# Patient Record
Sex: FEMALE | Race: WHITE | NOT HISPANIC OR LATINO | Employment: OTHER | URBAN - METROPOLITAN AREA
[De-identification: names, ages, dates, MRNs, and addresses within clinical notes are randomized per-mention and may not be internally consistent; named-entity substitution may affect disease eponyms.]

---

## 2023-02-08 ENCOUNTER — LAB REQUISITION (OUTPATIENT)
Dept: LAB | Facility: CLINIC | Age: 63
End: 2023-02-08
Payer: COMMERCIAL

## 2023-02-08 DIAGNOSIS — L90.0 LICHEN SCLEROSUS ET ATROPHICUS: ICD-10-CM

## 2023-02-08 PROCEDURE — 88305 TISSUE EXAM BY PATHOLOGIST: CPT | Mod: TC,ORL | Performed by: OBSTETRICS & GYNECOLOGY

## 2023-02-08 PROCEDURE — 88305 TISSUE EXAM BY PATHOLOGIST: CPT | Mod: 26 | Performed by: PATHOLOGY

## 2023-02-10 LAB
PATH REPORT.COMMENTS IMP SPEC: NORMAL
PATH REPORT.COMMENTS IMP SPEC: NORMAL
PATH REPORT.FINAL DX SPEC: NORMAL
PATH REPORT.GROSS SPEC: NORMAL
PATH REPORT.MICROSCOPIC SPEC OTHER STN: NORMAL
PATH REPORT.RELEVANT HX SPEC: NORMAL
PHOTO IMAGE: NORMAL

## 2024-04-10 ENCOUNTER — HOSPITAL ENCOUNTER (EMERGENCY)
Facility: CLINIC | Age: 64
Discharge: HOME | End: 2024-04-10
Attending: SOCIAL WORKER | Admitting: SOCIAL WORKER
Payer: COMMERCIAL

## 2024-04-10 ENCOUNTER — APPOINTMENT (OUTPATIENT)
Dept: GENERAL RADIOLOGY | Facility: CLINIC | Age: 64
End: 2024-04-10
Attending: EMERGENCY MEDICINE
Payer: COMMERCIAL

## 2024-04-10 VITALS
HEIGHT: 64 IN | HEART RATE: 78 BPM | TEMPERATURE: 98.7 F | OXYGEN SATURATION: 100 % | WEIGHT: 197.09 LBS | SYSTOLIC BLOOD PRESSURE: 162 MMHG | RESPIRATION RATE: 16 BRPM | DIASTOLIC BLOOD PRESSURE: 82 MMHG | BODY MASS INDEX: 33.65 KG/M2

## 2024-04-10 DIAGNOSIS — R01.1 HEART MURMUR: ICD-10-CM

## 2024-04-10 DIAGNOSIS — I10 HYPERTENSION, UNSPECIFIED TYPE: ICD-10-CM

## 2024-04-10 DIAGNOSIS — G47.09 OTHER INSOMNIA: ICD-10-CM

## 2024-04-10 LAB
ANION GAP SERPL CALCULATED.3IONS-SCNC: 14 MMOL/L (ref 7–15)
ATRIAL RATE - MUSE: 76 BPM
BASOPHILS # BLD AUTO: 0.1 10E3/UL (ref 0–0.2)
BASOPHILS NFR BLD AUTO: 1 %
BUN SERPL-MCNC: 10.2 MG/DL (ref 8–23)
CALCIUM SERPL-MCNC: 9.2 MG/DL (ref 8.8–10.2)
CHLORIDE SERPL-SCNC: 105 MMOL/L (ref 98–107)
CREAT SERPL-MCNC: 0.71 MG/DL (ref 0.51–0.95)
DEPRECATED HCO3 PLAS-SCNC: 23 MMOL/L (ref 22–29)
DIASTOLIC BLOOD PRESSURE - MUSE: NORMAL MMHG
EGFRCR SERPLBLD CKD-EPI 2021: >90 ML/MIN/1.73M2
EOSINOPHIL # BLD AUTO: 0.2 10E3/UL (ref 0–0.7)
EOSINOPHIL NFR BLD AUTO: 3 %
ERYTHROCYTE [DISTWIDTH] IN BLOOD BY AUTOMATED COUNT: 12.7 % (ref 10–15)
FLUAV RNA SPEC QL NAA+PROBE: NEGATIVE
FLUBV RNA RESP QL NAA+PROBE: NEGATIVE
GLUCOSE SERPL-MCNC: 92 MG/DL (ref 70–99)
HCT VFR BLD AUTO: 42.4 % (ref 35–47)
HGB BLD-MCNC: 14 G/DL (ref 11.7–15.7)
HOLD SPECIMEN: NORMAL
HOLD SPECIMEN: NORMAL
IMM GRANULOCYTES # BLD: 0 10E3/UL
IMM GRANULOCYTES NFR BLD: 0 %
INTERPRETATION ECG - MUSE: NORMAL
LYMPHOCYTES # BLD AUTO: 2.1 10E3/UL (ref 0.8–5.3)
LYMPHOCYTES NFR BLD AUTO: 35 %
MCH RBC QN AUTO: 29.7 PG (ref 26.5–33)
MCHC RBC AUTO-ENTMCNC: 33 G/DL (ref 31.5–36.5)
MCV RBC AUTO: 90 FL (ref 78–100)
MONOCYTES # BLD AUTO: 0.5 10E3/UL (ref 0–1.3)
MONOCYTES NFR BLD AUTO: 9 %
NEUTROPHILS # BLD AUTO: 3.1 10E3/UL (ref 1.6–8.3)
NEUTROPHILS NFR BLD AUTO: 52 %
NRBC # BLD AUTO: 0 10E3/UL
NRBC BLD AUTO-RTO: 0 /100
NT-PROBNP SERPL-MCNC: <36 PG/ML (ref 0–900)
P AXIS - MUSE: 52 DEGREES
PLATELET # BLD AUTO: 319 10E3/UL (ref 150–450)
POTASSIUM SERPL-SCNC: 4.2 MMOL/L (ref 3.4–5.3)
PR INTERVAL - MUSE: 150 MS
QRS DURATION - MUSE: 78 MS
QT - MUSE: 392 MS
QTC - MUSE: 441 MS
R AXIS - MUSE: 19 DEGREES
RBC # BLD AUTO: 4.71 10E6/UL (ref 3.8–5.2)
RSV RNA SPEC NAA+PROBE: NEGATIVE
SARS-COV-2 RNA RESP QL NAA+PROBE: NEGATIVE
SODIUM SERPL-SCNC: 142 MMOL/L (ref 135–145)
SYSTOLIC BLOOD PRESSURE - MUSE: NORMAL MMHG
T AXIS - MUSE: 31 DEGREES
TROPONIN T SERPL HS-MCNC: <6 NG/L
VENTRICULAR RATE- MUSE: 76 BPM
WBC # BLD AUTO: 5.9 10E3/UL (ref 4–11)

## 2024-04-10 PROCEDURE — 85025 COMPLETE CBC W/AUTO DIFF WBC: CPT | Performed by: EMERGENCY MEDICINE

## 2024-04-10 PROCEDURE — 85025 COMPLETE CBC W/AUTO DIFF WBC: CPT | Performed by: SOCIAL WORKER

## 2024-04-10 PROCEDURE — 71046 X-RAY EXAM CHEST 2 VIEWS: CPT

## 2024-04-10 PROCEDURE — 80048 BASIC METABOLIC PNL TOTAL CA: CPT | Performed by: EMERGENCY MEDICINE

## 2024-04-10 PROCEDURE — 84484 ASSAY OF TROPONIN QUANT: CPT | Performed by: SOCIAL WORKER

## 2024-04-10 PROCEDURE — 99285 EMERGENCY DEPT VISIT HI MDM: CPT | Mod: 25

## 2024-04-10 PROCEDURE — 84484 ASSAY OF TROPONIN QUANT: CPT | Performed by: EMERGENCY MEDICINE

## 2024-04-10 PROCEDURE — 80048 BASIC METABOLIC PNL TOTAL CA: CPT | Performed by: SOCIAL WORKER

## 2024-04-10 PROCEDURE — 83880 ASSAY OF NATRIURETIC PEPTIDE: CPT | Performed by: EMERGENCY MEDICINE

## 2024-04-10 PROCEDURE — 93005 ELECTROCARDIOGRAM TRACING: CPT

## 2024-04-10 PROCEDURE — 87637 SARSCOV2&INF A&B&RSV AMP PRB: CPT | Performed by: EMERGENCY MEDICINE

## 2024-04-10 PROCEDURE — 36415 COLL VENOUS BLD VENIPUNCTURE: CPT | Performed by: EMERGENCY MEDICINE

## 2024-04-10 RX ORDER — AMLODIPINE BESYLATE 5 MG/1
5 TABLET ORAL DAILY
Qty: 14 TABLET | Refills: 0 | Status: SHIPPED | OUTPATIENT
Start: 2024-04-10 | End: 2024-05-15

## 2024-04-10 ASSESSMENT — COLUMBIA-SUICIDE SEVERITY RATING SCALE - C-SSRS
6. HAVE YOU EVER DONE ANYTHING, STARTED TO DO ANYTHING, OR PREPARED TO DO ANYTHING TO END YOUR LIFE?: NO
2. HAVE YOU ACTUALLY HAD ANY THOUGHTS OF KILLING YOURSELF IN THE PAST MONTH?: NO
1. IN THE PAST MONTH, HAVE YOU WISHED YOU WERE DEAD OR WISHED YOU COULD GO TO SLEEP AND NOT WAKE UP?: NO

## 2024-04-10 ASSESSMENT — ACTIVITIES OF DAILY LIVING (ADL): ADLS_ACUITY_SCORE: 33

## 2024-04-10 NOTE — DISCHARGE INSTRUCTIONS
You were seen in the emergency department for elevated blood pressure, sensation of having to take a deep breath to fill your lungs, and insomnia.  Your exam was overall reassuring, we not see signs of an acute emergency at this time.  I am starting you on a blood pressure medication.  Please call your primary care clinic to schedule a follow-up appointment, they may want to change his blood pressure medication. You can try taking melatonin at home for your sleep, take this about 1 hour before going to bed.    Sent a referral to the cardiology team to follow-up with you and establish care given your known heart murmur.    If you start to have shortness of breath, chest pain when you are walking around especially if it is believed by sweating or nausea or vomiting, if you cough up blood, if you have sudden severe headache, weakness in one-sided body please come back to the emergency department.

## 2024-04-10 NOTE — ED TRIAGE NOTES
Pt here for high blood pressure readings that began after a walk today. Also mentions feeling more SOB, fatigued, and has some L arm tingling. No hx of hypertension. Breathing easy and unlabored in triage.

## 2024-04-10 NOTE — ED PROVIDER NOTES
"  History     Chief Complaint:  Hypertension    HPI   Isabela Mosqueda is a 64 year old female who presents with shortness of breath and concern for elevated blood pressure. Patient states that for the past few days she has been feeling like she has to yawn to get a full breath in. Also notes difficulty sleeping, increased fatigue, and some tingling in the arms. Reports that today she was on a walk with her , and when they arrived at home they both took her blood pressure; her  states that hers was 153/113. She had a recent OB/gyn visit with a blood pressure in the systolic 150's as well. She is not taking any hypertensive medications at this time. Denies any hemoptysis, leg swelling, or fevers. She has a doctor that she visits occasionally. Has a known heart murmur but has not seen cardiology for this. She has not tried melatonin to sleep. Otherwise denies any known health problems. Reports increased stress.     Independent Historian:    Patient and  report history as above.    Review of External Notes:  I reviewed the office visit from 1/31/2023, blood pressure at that time was noted to be in the systolics 150    Allergies:  Misc. Sulfonamide Containing Compounds  Sulfa Antibiotics     Relevant Medical History:  The patient denies any prior medical history.    Physical Exam   Patient Vitals for the past 24 hrs:   BP Temp Temp src Pulse Resp SpO2 Height Weight   04/10/24 1610 (!) 162/82 -- -- 78 16 100 % -- --   04/10/24 1230 (!) 160/92 98.7  F (37.1  C) Oral 80 20 97 % 1.626 m (5' 4\") 89.4 kg (197 lb 1.5 oz)        Physical Exam  General: Overall stable and nontoxic appearing  HEENT: Conjunctivae clear, no scleral icterus, mucous membranes moist  Neuro: Alert, moving all extremities equally with intention  CV: Systolic murmur. Regular rate and rhythm, radial and DP pulses equal  Respiratory: No signs of respiratory distress, lungs clear to auscultation bilaterally   Abdomen: Soft, without " rigidity or rebound throughout  MSK: No lower extremity swelling or tenderness     Emergency Department Course   ECG  Electrocardiogram  ECG results from 04/10/24   EKG 12-lead, tracing only     Value    Systolic Blood Pressure     Diastolic Blood Pressure     Ventricular Rate 76    Atrial Rate 76    VA Interval 150    QRS Duration 78        QTc 441    P Axis 52    R AXIS 19    T Axis 31    Interpretation ECG      Sinus rhythm  Normal ECG  No previous ECGs available  Unconfirmed report - interpretation of this ECG is computer generated - see medical record for final interpretation  Confirmed by - EMERGENCY ROOM, PHYSICIAN ([id number]),  JACOB LOPEZ ([id number]) on 4/10/2024 3:43:09 PM       Laboratory: Imaging:   Labs Ordered and Resulted from Time of ED Arrival to Time of ED Departure   BASIC METABOLIC PANEL - Normal       Result Value    Sodium 142      Potassium 4.2      Chloride 105      Carbon Dioxide (CO2) 23      Anion Gap 14      Urea Nitrogen 10.2      Creatinine 0.71      GFR Estimate >90      Calcium 9.2      Glucose 92     TROPONIN T, HIGH SENSITIVITY - Normal    Troponin T, High Sensitivity <6     NT PROBNP INPATIENT - Normal    N terminal Pro BNP Inpatient <36     INFLUENZA A/B, RSV, & SARS-COV2 PCR - Normal    Influenza A PCR Negative      Influenza B PCR Negative      RSV PCR Negative      SARS CoV2 PCR Negative     CBC WITH PLATELETS AND DIFFERENTIAL    WBC Count 5.9      RBC Count 4.71      Hemoglobin 14.0      Hematocrit 42.4      MCV 90      MCH 29.7      MCHC 33.0      RDW 12.7      Platelet Count 319      % Neutrophils 52      % Lymphocytes 35      % Monocytes 9      % Eosinophils 3      % Basophils 1      % Immature Granulocytes 0      NRBCs per 100 WBC 0      Absolute Neutrophils 3.1      Absolute Lymphocytes 2.1      Absolute Monocytes 0.5      Absolute Eosinophils 0.2      Absolute Basophils 0.1      Absolute Immature Granulocytes 0.0      Absolute NRBCs 0.0       XR Chest 2 Views    Final Result   IMPRESSION: No acute cardiopulmonary process.      EBER QIU MD            SYSTEM ID:  [id number]              Procedures       Emergency Department Course & Assessments:       Interventions:  Medications - No data to display     Assessments, Independent Interpretation, Consult/Discussion of ManagementTests:  ED Course as of 04/10/24 1713   Wed Apr 10, 2024   154 I entered the patient's room and obtained history. I believe she is safe for discharge at this time.        Social Determinants of Health affecting care:  None    Disposition:  The patient was discharged to home.     Impression & Plan    CMS Diagnoses: None    Code Status: No Order    Medical Decision Makin-year-old female with a known heart murmur since childhood, no other medical problems and not any medication who presented to the emergency department with a chief complaint of elevated blood pressure [place]  Also reported that she felt a sensation that she needed to yawn to take a deep breath in over the past five days.  She denied any overt shortness of breath [place].  Troponin entirely negative, ruled out for MI by [facility] ACS pathway.  Lungs clear to auscultation, chest x-ray without any consolidations.  No fever desist pneumonia.  Murmur present however no signs of decompensation secondary to this. BNP negative, no clinical signs of heart failure.  EKG nonischemic.  I doubt pulmonary embolism, not tachypneic for me, no hypoxia and ambulated without feeling short of breath. Description of her needing to take a deep breath does not sound like breathlessness.  Also doubt aortic dissection given overall well appearance, she has been documented to have hypertension to 150s up to 1 year ago.  Prescribed amlodipine for her, sent a referral for cardiology for follow-up for this known murmur she has never had cardiology follow-up for this.   Encouraged to follow-up with her primary care provider.  She will return to the emergency department for shortness of breath, chest pain, syncope, fever.    Diagnosis:    ICD-10-CM    1. Hypertension, unspecified type  I10       2. Other insomnia  G47.09            Discharge Medications:  New Prescriptions    AMLODIPINE (NORVASC) 5 MG TABLET    Take 1 tablet (5 mg) by mouth daily for 14 days          4/10/2024   Karen Rojas MD    IGianluca Hired, am serving as a scribe on 4/10/2024 to document services personally performed by Karen Rojas MD based on my observations and the provider's statements to me.        Karen Rojas MD  04/11/24 1133

## 2024-04-16 ENCOUNTER — OFFICE VISIT (OUTPATIENT)
Dept: CARDIOLOGY | Facility: CLINIC | Age: 64
End: 2024-04-16
Attending: SOCIAL WORKER
Payer: COMMERCIAL

## 2024-04-16 ENCOUNTER — TELEPHONE (OUTPATIENT)
Dept: CARDIOLOGY | Facility: CLINIC | Age: 64
End: 2024-04-16

## 2024-04-16 VITALS
WEIGHT: 197.7 LBS | DIASTOLIC BLOOD PRESSURE: 82 MMHG | HEIGHT: 64 IN | BODY MASS INDEX: 33.75 KG/M2 | HEART RATE: 80 BPM | SYSTOLIC BLOOD PRESSURE: 162 MMHG

## 2024-04-16 DIAGNOSIS — E78.2 MIXED HYPERLIPIDEMIA: ICD-10-CM

## 2024-04-16 DIAGNOSIS — Z82.49 FAMILY HISTORY OF ISCHEMIC HEART DISEASE: ICD-10-CM

## 2024-04-16 DIAGNOSIS — E66.09 CLASS 1 OBESITY DUE TO EXCESS CALORIES WITHOUT SERIOUS COMORBIDITY WITH BODY MASS INDEX (BMI) OF 33.0 TO 33.9 IN ADULT: ICD-10-CM

## 2024-04-16 DIAGNOSIS — R73.01 IMPAIRED FASTING GLUCOSE: ICD-10-CM

## 2024-04-16 DIAGNOSIS — E66.811 CLASS 1 OBESITY DUE TO EXCESS CALORIES WITHOUT SERIOUS COMORBIDITY WITH BODY MASS INDEX (BMI) OF 33.0 TO 33.9 IN ADULT: ICD-10-CM

## 2024-04-16 DIAGNOSIS — E78.2 MIXED HYPERLIPIDEMIA: Primary | ICD-10-CM

## 2024-04-16 DIAGNOSIS — R01.1 HEART MURMUR: ICD-10-CM

## 2024-04-16 DIAGNOSIS — I10 BENIGN ESSENTIAL HYPERTENSION: Primary | ICD-10-CM

## 2024-04-16 PROBLEM — E78.00 HYPERCHOLESTEROLEMIA: Status: ACTIVE | Noted: 2024-04-16

## 2024-04-16 PROCEDURE — 99204 OFFICE O/P NEW MOD 45 MIN: CPT | Performed by: INTERNAL MEDICINE

## 2024-04-16 RX ORDER — ROSUVASTATIN CALCIUM 10 MG/1
10 TABLET, COATED ORAL DAILY
Qty: 90 TABLET | Refills: 3 | Status: SHIPPED | OUTPATIENT
Start: 2024-04-16

## 2024-04-16 RX ORDER — FLUTICASONE PROPIONATE 50 MCG
2 SPRAY, SUSPENSION (ML) NASAL DAILY PRN
COMMUNITY

## 2024-04-16 RX ORDER — CLOBETASOL PROPIONATE 0.5 MG/G
CREAM TOPICAL
COMMUNITY

## 2024-04-16 RX ORDER — ESTRADIOL 0.1 MG/G
CREAM VAGINAL
COMMUNITY

## 2024-04-16 RX ORDER — COLLAGENASE CLOSTRIDIUM HIST.
POWDER (EA) MISCELLANEOUS DAILY
COMMUNITY
End: 2024-04-16

## 2024-04-16 NOTE — LETTER
4/16/2024    DANIEL Nixon CNP [place] [id number] [facility] [id number]    RE: Isabela A Jaylin       Dear Colleague,     I had the pleasure of seeing Isabela Mosqueda in the [facility].  HPI and Plan:   Isabela is a very nice 64-year-old referred for evaluation of heart murmur, hypertension and shortness of breath.      Review of the chart shows that she has impaired fasting glucose with a hemoglobin A1c of 5.9.  Mixed hyperlipidemia with total cholesterol 271 HDL 48  and triglycerides of 235.  She is a lifelong non-smoker.  Does not drink alcohol.  She is obese with a body mass index of 33.9    Family history is significant for mother having valve surgery and coronary bypass grafting in her late 60s living to 83 years of age.  Father has no cardiac history.  She states she has had a heart murmur her whole life.  Review of [facility] records shows an echocardiogram from 2004 describing a normal heart with normal valves.    She went to the ER last week because she had severe hypertension and some shortness of breath.  She states she has been under a great deal of stress.  Her  had his first stroke back in September about 2 weeks before their daughter's wedding.  He had a significant fall 2 weeks after the wedding.  And unfortunately in December had another cerebrovascular accident and is now retired which is also causing her stress.    Prior to her daughter's wedding she was working out with a  and was in great shape and had no symptoms.  Unfortunately she has not exercised since that time.  She has gained 10 pounds of weight.  She still walks about a mile and a half with her  every day without any limitations or problems.  She states she was only short of breath the day she went into the ER.  She has no orthopnea or PND.  She has no chest arm neck jaw or shoulder discomfort.  No peripheral edema.  No lightheadedness dizziness syncope or near  syncope.    Workup in the ER showed normal troponins and a normal N-terminal proBNP.  EKG demonstrates normal sinus rhythm and a normal EKG.    Her primary care provider has started her on amlodipine 5.  She thinks they may have already started a statin as well.  I do not see this in her chart.    Assessment and plan.  Isabela has a impressive murmur possibly aortic stenosis.  Will start out with a stress echocardiogram.  This will allow us to see if there is any structural abnormalities and also to see what her rhythm and blood pressure does with exercise.  It will also give us insight as to whether she has any significant underlying coronary disease.    Obviously if there is significant valvular pathology or abnormal stress test we will proceed as appropriate.  If on the other hand she does not appear to have ischemia or significant valve problems I will set up a calcium score.    I think her symptoms are multifactorial.  In addition to possibly valve disease and coronary artery disease it is partially due to obesity, deconditioning, weight gain and poorly controlled blood pressure.    She has significant hypercholesterolemia.  We talked about the importance of regular exercise, predominately plant-based diet and weight loss.  I am inclined to start a statin at this time.  I will double check with [facility] to make sure 1 was not started.  Otherwise I would start rosuvastatin 20 mg daily.  If calcium score significantly elevated I will also start aspirin 81 mg, Monday Wednesday Friday    Further evaluation treatment depend upon above results. Thank you for allow me to participate in this patient's care.  Sincerely,                               Manuel Lua MD [facility]      Today's clinic visit entailed:  Review of external notes as documented elsewhere in note  Review of the result(s) of each unique test - EKG, lab work, echocardiogram, outside records  The following tests were independently interpreted by  me as noted in my documentation: EKG  Ordering of each unique test  Prescription drug management  45 minutes spent by me on the date of the encounter doing chart review, history and exam, documentation and further activities per the note  Provider  Link to MDM Help Grid     The level of medical decision making during this visit was of moderate complexity.      Orders Placed This Encounter   Procedures    Basic metabolic panel    Lipid Profile    ALT    Follow-Up with Cardiology ANDREW    Follow-Up with Cardiology    Exercise Stress Echocardiogram    Echocardiogram Complete       Orders Placed This Encounter   Medications    FLUoxetine (PROZAC) 20 MG capsule     Sig: Take 1 capsule by mouth daily    fluticasone (FLONASE) 50 MCG/ACT nasal spray     Sig: Spray 2 sprays into both nostrils daily as needed    estradiol (ESTRACE) 0.1 MG/GM vaginal cream     Sig: Insert 0.5 applicatorsful twice a week by vaginal route at bedtime.    clobetasol propionate (TEMOVATE) 0.05 % external cream     Sig: APPLY A THIN LAYER TO THE AFFECTED AREA(S) BY TOPICAL ROUTE AS DIRECTED    Probiotic Product (PROBIOTIC DAILY PO)     Sig: Take by mouth daily    Multiple Vitamins-Minerals (MULTIVITAMIN ADULTS PO)     Sig: Taking off and on    DISCONTD: Collagenase POWD     Sig: daily    COLLAGEN PO     Sig: Take by mouth daily Powder form       Medications Discontinued During This Encounter   Medication Reason    Collagenase POWD Med Rec(No AVS / No eCancel)         Encounter Diagnoses   Name Primary?    Heart murmur     Benign essential hypertension Yes    Family history of ischemic heart disease     Class 1 obesity due to excess calories without serious comorbidity with body mass index (BMI) of 33.0 to 33.9 in adult     Impaired fasting glucose     Mixed hyperlipidemia        CURRENT MEDICATIONS:  Current Outpatient Medications   Medication Sig Dispense Refill    amLODIPine (NORVASC) 5 MG tablet Take 1 tablet (5 mg) by mouth daily for 14 days 14  "tablet 0    clobetasol propionate (TEMOVATE) 0.05 % external cream APPLY A THIN LAYER TO THE AFFECTED AREA(S) BY TOPICAL ROUTE AS DIRECTED      COLLAGEN PO Take by mouth daily Powder form      estradiol (ESTRACE) 0.1 MG/GM vaginal cream Insert 0.5 applicatorsful twice a week by vaginal route at bedtime.      FLUoxetine (PROZAC) 20 MG capsule Take 1 capsule by mouth daily      fluticasone (FLONASE) 50 MCG/ACT nasal spray Spray 2 sprays into both nostrils daily as needed      Multiple Vitamins-Minerals (MULTIVITAMIN ADULTS PO) Taking off and on      Probiotic Product (PROBIOTIC DAILY PO) Take by mouth daily         ALLERGIES     Allergies   Allergen Reactions    Misc. Sulfonamide Containing Compounds Itching    Sulfa Antibiotics Rash       PAST MEDICAL HISTORY:  No past medical history on file.    PAST SURGICAL HISTORY:  No past surgical history on file.    FAMILY HISTORY:  History reviewed. No pertinent family history.    SOCIAL HISTORY:  Social History     Socioeconomic History    Marital status:      Spouse name: None    Number of children: None    Years of education: None    Highest education level: None   Tobacco Use    Smoking status: Never    Smokeless tobacco: Never   Substance and Sexual Activity    Alcohol use: Yes     Comment: once in a while       Review of Systems:  Skin:  Positive for   brown spots on back   Eyes:  Positive for glasses reading glasses  ENT:  Negative      Respiratory:  Positive for shortness of breath dyspnea is \"stress related\" per pt   Cardiovascular:    Positive for;fatigue    Gastroenterology: Positive for heartburn    Genitourinary:  Negative      Musculoskeletal:  Negative      Neurologic:  Negative      Psychiatric:  Positive for sleep disturbances;excessive stress    Heme/Lymph/Imm:  Positive for allergies RX  Endocrine:  Negative        Physical Exam:  Vitals: BP (!) 162/82   Pulse 80   Ht 1.626 m (5' 4\")   Wt 89.7 kg (197 lb 11.2 oz)   BMI 33.94 kg/m  "     Constitutional:  cooperative, alert and oriented, well developed, well nourished, in no acute distress obese      Skin:  warm and dry to the touch, no apparent skin lesions or masses noted          Head:  normocephalic, no masses or lesions        Eyes:  pupils equal and round, conjunctivae and lids unremarkable, sclera white, no xanthalasma, EOMS intact, no nystagmus        Lymph:      ENT:  no pallor or cyanosis, dentition good        Neck:  no carotid bruit        Respiratory:  normal breath sounds, clear to auscultation, normal A-P diameter, normal symmetry, normal respiratory excursion, no use of accessory muscles         Cardiac: regular rhythm       systolic murmur;grade 3;radiation to the carotid;RUSB;LUSB        pulses full and equal                                        GI:           Extremities and Muscular Skeletal:  no edema;no spinal abnormalities noted;normal muscle strength and tone              Neurological:  no gross motor deficits        Psych:  affect appropriate, oriented to time, person and place        CC  Karen La MD  [facility]  [address]  [address]  [place] [address]                  Thank you for allowing me to participate in the care of your patient.      Sincerely,     Manuel Lua MD     [facility]

## 2024-04-16 NOTE — PROGRESS NOTES
HPI and Plan:   Isabela is a very nice 64-year-old referred for evaluation of heart murmur, hypertension and shortness of breath.      Review of the chart shows that she has impaired fasting glucose with a hemoglobin A1c of 5.9.  Mixed hyperlipidemia with total cholesterol 271 HDL 48  and triglycerides of 235.  She is a lifelong non-smoker.  Does not drink alcohol.  She is obese with a body mass index of 33.9    Family history is significant for mother having valve surgery and coronary bypass grafting in her late 60s living to 83 years of age.  Father has no cardiac history.  She states she has had a heart murmur her whole life.  Review of [facility] records shows an echocardiogram from 2004 describing a normal heart with normal valves.    She went to the ER last week because she had severe hypertension and some shortness of breath.  She states she has been under a great deal of stress.  Her  had his first stroke back in September about 2 weeks before their daughter's wedding.  He had a significant fall 2 weeks after the wedding.  And unfortunately in December had another cerebrovascular accident and is now retired which is also causing her stress.    Prior to her daughter's wedding she was working out with a  and was in great shape and had no symptoms.  Unfortunately she has not exercised since that time.  She has gained 10 pounds of weight.  She still walks about a mile and a half with her  every day without any limitations or problems.  She states she was only short of breath the day she went into the ER.  She has no orthopnea or PND.  She has no chest arm neck jaw or shoulder discomfort.  No peripheral edema.  No lightheadedness dizziness syncope or near syncope.    Workup in the ER showed normal troponins and a normal N-terminal proBNP.  EKG demonstrates normal sinus rhythm and a normal EKG.    Her primary care provider has started her on amlodipine 5.  She thinks they may have  already started a statin as well.  I do not see this in her chart.    Assessment and plan.  Isabela has a impressive murmur possibly aortic stenosis.  Will start out with a stress echocardiogram.  This will allow us to see if there is any structural abnormalities and also to see what her rhythm and blood pressure does with exercise.  It will also give us insight as to whether she has any significant underlying coronary disease.    Obviously if there is significant valvular pathology or abnormal stress test we will proceed as appropriate.  If on the other hand she does not appear to have ischemia or significant valve problems I will set up a calcium score.    I think her symptoms are multifactorial.  In addition to possibly valve disease and coronary artery disease it is partially due to obesity, deconditioning, weight gain and poorly controlled blood pressure.    She has significant hypercholesterolemia.  We talked about the importance of regular exercise, predominately plant-based diet and weight loss.  I am inclined to start a statin at this time.  I will double check with [facility] to make sure 1 was not started.  Otherwise I would start rosuvastatin 20 mg daily.  If calcium score significantly elevated I will also start aspirin 81 mg, Monday Wednesday Friday    Further evaluation treatment depend upon above results. Thank you for allow me to participate in this patient's care.  Sincerely,                               Manuel Lua MD [facility]      Today's clinic visit entailed:  Review of external notes as documented elsewhere in note  Review of the result(s) of each unique test - EKG, lab work, echocardiogram, outside records  The following tests were independently interpreted by me as noted in my documentation: EKG  Ordering of each unique test  Prescription drug management  45 minutes spent by me on the date of the encounter doing chart review, history and exam, documentation and further activities per  the note  Provider  Link to MDM Help Grid     The level of medical decision making during this visit was of moderate complexity.      Orders Placed This Encounter   Procedures    Basic metabolic panel    Lipid Profile    ALT    Follow-Up with Cardiology ANDREW    Follow-Up with Cardiology    Exercise Stress Echocardiogram    Echocardiogram Complete       Orders Placed This Encounter   Medications    FLUoxetine (PROZAC) 20 MG capsule     Sig: Take 1 capsule by mouth daily    fluticasone (FLONASE) 50 MCG/ACT nasal spray     Sig: Spray 2 sprays into both nostrils daily as needed    estradiol (ESTRACE) 0.1 MG/GM vaginal cream     Sig: Insert 0.5 applicatorsful twice a week by vaginal route at bedtime.    clobetasol propionate (TEMOVATE) 0.05 % external cream     Sig: APPLY A THIN LAYER TO THE AFFECTED AREA(S) BY TOPICAL ROUTE AS DIRECTED    Probiotic Product (PROBIOTIC DAILY PO)     Sig: Take by mouth daily    Multiple Vitamins-Minerals (MULTIVITAMIN ADULTS PO)     Sig: Taking off and on    DISCONTD: Collagenase POWD     Sig: daily    COLLAGEN PO     Sig: Take by mouth daily Powder form       Medications Discontinued During This Encounter   Medication Reason    Collagenase POWD Med Rec(No AVS / No eCancel)         Encounter Diagnoses   Name Primary?    Heart murmur     Benign essential hypertension Yes    Family history of ischemic heart disease     Class 1 obesity due to excess calories without serious comorbidity with body mass index (BMI) of 33.0 to 33.9 in adult     Impaired fasting glucose     Mixed hyperlipidemia        CURRENT MEDICATIONS:  Current Outpatient Medications   Medication Sig Dispense Refill    amLODIPine (NORVASC) 5 MG tablet Take 1 tablet (5 mg) by mouth daily for 14 days 14 tablet 0    clobetasol propionate (TEMOVATE) 0.05 % external cream APPLY A THIN LAYER TO THE AFFECTED AREA(S) BY TOPICAL ROUTE AS DIRECTED      COLLAGEN PO Take by mouth daily Powder form      estradiol (ESTRACE) 0.1 MG/GM vaginal  "cream Insert 0.5 applicatorsful twice a week by vaginal route at bedtime.      FLUoxetine (PROZAC) 20 MG capsule Take 1 capsule by mouth daily      fluticasone (FLONASE) 50 MCG/ACT nasal spray Spray 2 sprays into both nostrils daily as needed      Multiple Vitamins-Minerals (MULTIVITAMIN ADULTS PO) Taking off and on      Probiotic Product (PROBIOTIC DAILY PO) Take by mouth daily         ALLERGIES     Allergies   Allergen Reactions    Misc. Sulfonamide Containing Compounds Itching    Sulfa Antibiotics Rash       PAST MEDICAL HISTORY:  No past medical history on file.    PAST SURGICAL HISTORY:  No past surgical history on file.    FAMILY HISTORY:  History reviewed. No pertinent family history.    SOCIAL HISTORY:  Social History     Socioeconomic History    Marital status:      Spouse name: None    Number of children: None    Years of education: None    Highest education level: None   Tobacco Use    Smoking status: Never    Smokeless tobacco: Never   Substance and Sexual Activity    Alcohol use: Yes     Comment: once in a while       Review of Systems:  Skin:  Positive for   brown spots on back   Eyes:  Positive for glasses reading glasses  ENT:  Negative      Respiratory:  Positive for shortness of breath dyspnea is \"stress related\" per pt   Cardiovascular:    Positive for;fatigue    Gastroenterology: Positive for heartburn    Genitourinary:  Negative      Musculoskeletal:  Negative      Neurologic:  Negative      Psychiatric:  Positive for sleep disturbances;excessive stress    Heme/Lymph/Imm:  Positive for allergies RX  Endocrine:  Negative        Physical Exam:  Vitals: BP (!) 162/82   Pulse 80   Ht 1.626 m (5' 4\")   Wt 89.7 kg (197 lb 11.2 oz)   BMI 33.94 kg/m      Constitutional:  cooperative, alert and oriented, well developed, well nourished, in no acute distress obese      Skin:  warm and dry to the touch, no apparent skin lesions or masses noted          Head:  normocephalic, no masses or lesions   "      Eyes:  pupils equal and round, conjunctivae and lids unremarkable, sclera white, no xanthalasma, EOMS intact, no nystagmus        Lymph:      ENT:  no pallor or cyanosis, dentition good        Neck:  no carotid bruit        Respiratory:  normal breath sounds, clear to auscultation, normal A-P diameter, normal symmetry, normal respiratory excursion, no use of accessory muscles         Cardiac: regular rhythm       systolic murmur;grade 3;radiation to the carotid;RUSB;LUSB        pulses full and equal                                        GI:           Extremities and Muscular Skeletal:  no edema;no spinal abnormalities noted;normal muscle strength and tone              Neurological:  no gross motor deficits        Psych:  affect appropriate, oriented to time, person and place        CC  Karen La MD  [facility]  [address]  [place] [address]

## 2024-04-16 NOTE — TELEPHONE ENCOUNTER
My chart message from Dr. Lua:  Manuel Lua MD  [facility] 2  Patient thinks she was [facility] on a statin [facility]  I only see amlodipine and an anxiolytic.  Can we see if she was started on a statin otherwise start rosuvastatin 10 mg daily.  And add a fasting lipid profile and ALT to her 1 month follow-up visit    PCP visit 4/12/2024 showed [id number]      1525 spoke with patient to verify that she was not started on a statin by [facility]. She reviewed her med list and is on amlodipine and prozac. Reviewed the use of thes meds with her.   She is willing to try the crestor 10mg daily. Rx escripted.  Patient will call the [facility] to set up stress echo, ANDREW visit and labs at 30 days.

## 2024-05-01 ENCOUNTER — LAB (OUTPATIENT)
Dept: LAB | Facility: CLINIC | Age: 64
End: 2024-05-01
Payer: COMMERCIAL

## 2024-05-01 ENCOUNTER — HOSPITAL ENCOUNTER (OUTPATIENT)
Dept: CARDIOLOGY | Facility: CLINIC | Age: 64
Discharge: HOME | End: 2024-05-01
Attending: INTERNAL MEDICINE | Admitting: INTERNAL MEDICINE
Payer: COMMERCIAL

## 2024-05-01 DIAGNOSIS — R01.1 HEART MURMUR: ICD-10-CM

## 2024-05-01 DIAGNOSIS — Z82.49 FAMILY HISTORY OF ISCHEMIC HEART DISEASE: ICD-10-CM

## 2024-05-01 LAB
ALT SERPL W P-5'-P-CCNC: 24 U/L (ref 0–50)
ANION GAP SERPL CALCULATED.3IONS-SCNC: 14 MMOL/L (ref 7–15)
BUN SERPL-MCNC: 11.9 MG/DL (ref 8–23)
CALCIUM SERPL-MCNC: 8.7 MG/DL (ref 8.8–10.2)
CHLORIDE SERPL-SCNC: 106 MMOL/L (ref 98–107)
CHOLEST SERPL-MCNC: 150 MG/DL
CREAT SERPL-MCNC: 0.7 MG/DL (ref 0.51–0.95)
DEPRECATED HCO3 PLAS-SCNC: 22 MMOL/L (ref 22–29)
EGFRCR SERPLBLD CKD-EPI 2021: >90 ML/MIN/1.73M2
FASTING STATUS PATIENT QL REPORTED: YES
GLUCOSE SERPL-MCNC: 111 MG/DL (ref 70–99)
HDLC SERPL-MCNC: 47 MG/DL
LDLC SERPL CALC-MCNC: 81 MG/DL
NONHDLC SERPL-MCNC: 103 MG/DL
POTASSIUM SERPL-SCNC: 4 MMOL/L (ref 3.4–5.3)
SODIUM SERPL-SCNC: 142 MMOL/L (ref 135–145)
TRIGL SERPL-MCNC: 110 MG/DL

## 2024-05-01 PROCEDURE — 93325 DOPPLER ECHO COLOR FLOW MAPG: CPT | Mod: 26 | Performed by: INTERNAL MEDICINE

## 2024-05-01 PROCEDURE — 93018 CV STRESS TEST I&R ONLY: CPT | Performed by: INTERNAL MEDICINE

## 2024-05-01 PROCEDURE — 255N000002 HC RX 255 OP 636: Performed by: INTERNAL MEDICINE

## 2024-05-01 PROCEDURE — 93016 CV STRESS TEST SUPVJ ONLY: CPT | Performed by: INTERNAL MEDICINE

## 2024-05-01 PROCEDURE — 93350 STRESS TTE ONLY: CPT | Mod: 26 | Performed by: INTERNAL MEDICINE

## 2024-05-01 PROCEDURE — 84460 ALANINE AMINO (ALT) (SGPT): CPT | Performed by: INTERNAL MEDICINE

## 2024-05-01 PROCEDURE — 93321 DOPPLER ECHO F-UP/LMTD STD: CPT | Mod: 26 | Performed by: INTERNAL MEDICINE

## 2024-05-01 PROCEDURE — 80048 BASIC METABOLIC PNL TOTAL CA: CPT | Performed by: INTERNAL MEDICINE

## 2024-05-01 PROCEDURE — 36415 COLL VENOUS BLD VENIPUNCTURE: CPT | Performed by: INTERNAL MEDICINE

## 2024-05-01 PROCEDURE — 93325 DOPPLER ECHO COLOR FLOW MAPG: CPT | Mod: TC

## 2024-05-01 PROCEDURE — 80061 LIPID PANEL: CPT | Performed by: INTERNAL MEDICINE

## 2024-05-01 RX ADMIN — HUMAN ALBUMIN MICROSPHERES AND PERFLUTREN 3 ML: 10; .22 INJECTION, SOLUTION INTRAVENOUS at 09:42

## 2024-05-13 NOTE — PROGRESS NOTES
HISTORY OF PRESENT ILLNESS:    This is a 64 year old female who follows with Dr Lua at [facility]  Her past medical history includes:  Hypertension, hyperlipidemia, prediabetes, obesity, family history of premature coronary artery disease    Ms Mosqueda was seen in our clinic recently after an ED visit for hypertensive urgency and shortness of breath in the setting of stress  She ruled out for MI and was started on Amlodipine  An stress echo was arranged due to an audible murmur  She was also started on a statin and ASA 81 mg    Stress echo (5/1/24) showed no evidence of stress-induced ischemia  LVEF 55-60%  Moderate aortic sclerosis with mild aortic regurgitation   BP: 130/76 mmHg   Labs then showed fairly normal BMP  total cholesterol: 150  Triglycerides: 110  HDL: 47  LDL: 81  ALT: 24    Our visit today is for further review      Ms Mosqueda overall feels like she is tolerating Amlodipine and Crestor  She denies any chest pain or significant shortness of breath  She admits to eating a high salt diet  She used to exercise at the gym by using the elliptical machine 30 min most days of the week and is hoping to resume this  She is now sleeping better with Prozac  She denies palpitations, orthopnea, or peripheral edema    She checks her blood pressure at home and gets 130/70s many times      VITAL SIGNS:  BP:  142/76, 130/70 at home  Pulse:  70  Weight:  194 lbs (BMI: 33)    IMPRESSION AND PLAN:    Hypertension:  -on Amlodipine 5 mg  -BP better    -she will call with persistent BP > 140/90 mmHg    Hyperlipidemia:  -on Crestor 10 mg   (Baseline )  Now down to 81  -continue current medical regimen    Family Hx of CAD  -nl stress echo  LVEF 55%  -denies angina  -recently started on statin and ASA    Aortic Sclerosis, mild aortic regurgitation    Obesity  Prediabetes:  -Hgb A1C 5.9  -encouraged weight loss and lifestyle modifications    The total time for the visit today was 30 minutes which includes  patient visit, reviewing of records, discussion, and placing of orders of the outpatient coordination of cardiovascular care as described.  The level of medical decision making during this visit was of moderate complexity.  Thank you for allowing me to participate in their care.    The longitudinal plan of care for hypertension, hyperlipidemia as documented were addressed during this visit. Due to the added complexity in care, I will continue to support Isabela in the subsequent management and with ongoing continuity of care.      No orders of the defined types were placed in this encounter.      No orders of the defined types were placed in this encounter.      There are no discontinued medications.      Encounter Diagnoses   Name Primary?    Heart murmur     Benign essential hypertension     Family history of ischemic heart disease     Class 1 obesity due to excess calories without serious comorbidity with body mass index (BMI) of 33.0 to 33.9 in adult     Impaired fasting glucose        CURRENT MEDICATIONS:  Current Outpatient Medications   Medication Sig Dispense Refill    amLODIPine (NORVASC) 5 MG tablet Take 1 tablet (5 mg) by mouth daily for 14 days 14 tablet 0    clobetasol propionate (TEMOVATE) 0.05 % external cream APPLY A THIN LAYER TO THE AFFECTED AREA(S) BY TOPICAL ROUTE AS DIRECTED      COLLAGEN PO Take by mouth daily Powder form      estradiol (ESTRACE) 0.1 MG/GM vaginal cream Insert 0.5 applicatorsful twice a week by vaginal route at bedtime.      FLUoxetine (PROZAC) 20 MG capsule Take 1 capsule by mouth daily      fluticasone (FLONASE) 50 MCG/ACT nasal spray Spray 2 sprays into both nostrils daily as needed      Multiple Vitamins-Minerals (MULTIVITAMIN ADULTS PO) Taking off and on      Probiotic Product (PROBIOTIC DAILY PO) Take by mouth daily      rosuvastatin (CRESTOR) 10 MG tablet Take 1 tablet (10 mg) by mouth daily 90 tablet 3       ALLERGIES     Allergies   Allergen Reactions    Cats Itching  "   Misc. Sulfonamide Containing Compounds Itching    Smoke. Itching     Cigarette smoke    Sulfa Antibiotics Rash       PAST MEDICAL HISTORY:  History reviewed. No pertinent past medical history.    PAST SURGICAL HISTORY:  History reviewed. No pertinent surgical history.    FAMILY HISTORY:  History reviewed. No pertinent family history.    SOCIAL HISTORY:  Social History     Socioeconomic History    Marital status:      Spouse name: None    Number of children: None    Years of education: None    Highest education level: None   Tobacco Use    Smoking status: Never    Smokeless tobacco: Never   Substance and Sexual Activity    Alcohol use: Yes     Comment: once in a while       Review of Systems:  Skin:          Eyes:         ENT:         Respiratory:  Negative       Cardiovascular:  Negative      Gastroenterology:        Genitourinary:         Musculoskeletal:         Neurologic:         Psychiatric:         Heme/Lymph/Imm:         Endocrine:           Physical Exam:  Vitals: /70   Pulse 70   Ht 1.626 m (5' 4\")   Wt 88.4 kg (194 lb 12.8 oz)   SpO2 97%   BMI 33.44 kg/m      Constitutional:  cooperative obese      Skin:  warm and dry to the touch          Head:  normocephalic        Eyes:  pupils equal and round        Lymph:      ENT:  no pallor or cyanosis, dentition good        Neck:  no carotid bruit        Respiratory:  clear to auscultation         Cardiac: regular rhythm       systolic murmur;grade 3;radiation to the carotid;RUSB;LUSB        pulses full and equal                                        GI:           Extremities and Muscular Skeletal:  no edema              Neurological:  no gross motor deficits        Psych:  affect appropriate, oriented to time, person and place          CC  Manuel Lua MD  [address]  [place] [address]                    "

## 2024-05-15 ENCOUNTER — OFFICE VISIT (OUTPATIENT)
Dept: CARDIOLOGY | Facility: CLINIC | Age: 64
End: 2024-05-15
Payer: COMMERCIAL

## 2024-05-15 VITALS
HEART RATE: 70 BPM | HEIGHT: 64 IN | BODY MASS INDEX: 33.26 KG/M2 | OXYGEN SATURATION: 97 % | DIASTOLIC BLOOD PRESSURE: 70 MMHG | SYSTOLIC BLOOD PRESSURE: 130 MMHG | WEIGHT: 194.8 LBS

## 2024-05-15 DIAGNOSIS — R73.01 IMPAIRED FASTING GLUCOSE: ICD-10-CM

## 2024-05-15 DIAGNOSIS — I10 BENIGN ESSENTIAL HYPERTENSION: ICD-10-CM

## 2024-05-15 DIAGNOSIS — R01.1 HEART MURMUR: ICD-10-CM

## 2024-05-15 DIAGNOSIS — E66.09 CLASS 1 OBESITY DUE TO EXCESS CALORIES WITHOUT SERIOUS COMORBIDITY WITH BODY MASS INDEX (BMI) OF 33.0 TO 33.9 IN ADULT: ICD-10-CM

## 2024-05-15 DIAGNOSIS — Z82.49 FAMILY HISTORY OF ISCHEMIC HEART DISEASE: ICD-10-CM

## 2024-05-15 DIAGNOSIS — E66.811 CLASS 1 OBESITY DUE TO EXCESS CALORIES WITHOUT SERIOUS COMORBIDITY WITH BODY MASS INDEX (BMI) OF 33.0 TO 33.9 IN ADULT: ICD-10-CM

## 2024-05-15 PROCEDURE — G2211 COMPLEX E/M VISIT ADD ON: HCPCS | Performed by: NURSE PRACTITIONER

## 2024-05-15 PROCEDURE — 99214 OFFICE O/P EST MOD 30 MIN: CPT | Performed by: NURSE PRACTITIONER

## 2024-05-15 NOTE — LETTER
5/15/2024    Lakhwinder Kuenzel, APRN CNP  [facility] [address] [facility] [address]    RE: Isabela Lalleon       Dear Colleague,     I had the pleasure of seeing Isabela Mosqueda in the [facility].  HISTORY OF PRESENT ILLNESS:    This is a 64 year old female who follows with Dr Lua at [facility]  Her past medical history includes:  Hypertension, hyperlipidemia, prediabetes, obesity, family history of premature coronary artery disease    Ms Mosqueda was seen in our clinic recently after an ED visit for hypertensive urgency and shortness of breath in the setting of stress  She ruled out for MI and was started on Amlodipine  An stress echo was arranged due to an audible murmur  She was also started on a statin and ASA 81 mg    Stress echo (5/1/24) showed no evidence of stress-induced ischemia  LVEF 55-60%  Moderate aortic sclerosis with mild aortic regurgitation   BP: 130/76 mmHg   Labs then showed fairly normal BMP  total cholesterol: 150  Triglycerides: 110  HDL: 47  LDL: 81  ALT: 24    Our visit today is for further review      Ms Mosqueda overall feels like she is tolerating Amlodipine and Crestor  She denies any chest pain or significant shortness of breath  She admits to eating a high salt diet  She used to exercise at the gym by using the elliptical machine 30 min most days of the week and is hoping to resume this  She is now sleeping better with Prozac  She denies palpitations, orthopnea, or peripheral edema    She checks her blood pressure at home and gets 130/70s many times      VITAL SIGNS:  BP:  142/76, 130/70 at home  Pulse:  70  Weight:  194 lbs (BMI: 33)    IMPRESSION AND PLAN:    Hypertension:  -on Amlodipine 5 mg  -BP better    -she will call with persistent BP > 140/90 mmHg    Hyperlipidemia:  -on Crestor 10 mg   (Baseline )  Now down to 81  -continue current medical regimen    Family Hx of CAD  -nl stress echo  LVEF 55%  -denies angina  -recently  started on statin and ASA    Aortic Sclerosis, mild aortic regurgitation    Obesity  Prediabetes:  -Hgb A1C 5.9  -encouraged weight loss and lifestyle modifications    The total time for the visit today was 30 minutes which includes patient visit, reviewing of records, discussion, and placing of orders of the outpatient coordination of cardiovascular care as described.  The level of medical decision making during this visit was of moderate complexity.  Thank you for allowing me to participate in their care.    The longitudinal plan of care for hypertension, hyperlipidemia as documented were addressed during this visit. Due to the added complexity in care, I will continue to support Isabela in the subsequent management and with ongoing continuity of care.      No orders of the defined types were placed in this encounter.      No orders of the defined types were placed in this encounter.      There are no discontinued medications.      Encounter Diagnoses   Name Primary?    Heart murmur     Benign essential hypertension     Family history of ischemic heart disease     Class 1 obesity due to excess calories without serious comorbidity with body mass index (BMI) of 33.0 to 33.9 in adult     Impaired fasting glucose        CURRENT MEDICATIONS:  Current Outpatient Medications   Medication Sig Dispense Refill    amLODIPine (NORVASC) 5 MG tablet Take 1 tablet (5 mg) by mouth daily for 14 days 14 tablet 0    clobetasol propionate (TEMOVATE) 0.05 % external cream APPLY A THIN LAYER TO THE AFFECTED AREA(S) BY TOPICAL ROUTE AS DIRECTED      COLLAGEN PO Take by mouth daily Powder form      estradiol (ESTRACE) 0.1 MG/GM vaginal cream Insert 0.5 applicatorsful twice a week by vaginal route at bedtime.      FLUoxetine (PROZAC) 20 MG capsule Take 1 capsule by mouth daily      fluticasone (FLONASE) 50 MCG/ACT nasal spray Spray 2 sprays into both nostrils daily as needed      Multiple Vitamins-Minerals (MULTIVITAMIN ADULTS PO) Taking  "off and on      Probiotic Product (PROBIOTIC DAILY PO) Take by mouth daily      rosuvastatin (CRESTOR) 10 MG tablet Take 1 tablet (10 mg) by mouth daily 90 tablet 3       ALLERGIES     Allergies   Allergen Reactions    Cats Itching    Misc. Sulfonamide Containing Compounds Itching    Smoke. Itching     Cigarette smoke    Sulfa Antibiotics Rash       PAST MEDICAL HISTORY:  History reviewed. No pertinent past medical history.    PAST SURGICAL HISTORY:  History reviewed. No pertinent surgical history.    FAMILY HISTORY:  History reviewed. No pertinent family history.    SOCIAL HISTORY:  Social History     Socioeconomic History    Marital status:      Spouse name: None    Number of children: None    Years of education: None    Highest education level: None   Tobacco Use    Smoking status: Never    Smokeless tobacco: Never   Substance and Sexual Activity    Alcohol use: Yes     Comment: once in a while       Review of Systems:  Skin:          Eyes:         ENT:         Respiratory:  Negative       Cardiovascular:  Negative      Gastroenterology:        Genitourinary:         Musculoskeletal:         Neurologic:         Psychiatric:         Heme/Lymph/Imm:         Endocrine:           Physical Exam:  Vitals: /70   Pulse 70   Ht 1.626 m (5' 4\")   Wt 88.4 kg (194 lb 12.8 oz)   SpO2 97%   BMI 33.44 kg/m      Constitutional:  cooperative obese      Skin:  warm and dry to the touch          Head:  normocephalic        Eyes:  pupils equal and round        Lymph:      ENT:  no pallor or cyanosis, dentition good        Neck:  no carotid bruit        Respiratory:  clear to auscultation         Cardiac: regular rhythm       systolic murmur;grade 3;radiation to the carotid;RUSB;LUSB        pulses full and equal                                        GI:           Extremities and Muscular Skeletal:  no edema              Neurological:  no gross motor deficits        Psych:  affect appropriate, oriented to time, " person and place          CC  Manuel Lua MD  [address]  [place] [address]      Thank you for allowing me to participate in the care of your patient.      Sincerely,     DANIEL Wise [facility]

## 2024-05-15 NOTE — PATIENT INSTRUCTIONS
Call with persistent BP > 130/80 mmHg and we would consider increasing your Amlodipine  Work on lifestyle modifications, weight reduction, limiting salt and sugars    It was a pleasure seeing you today     Please do not hesitate to call my nurse team with any questions or concerns:  [phone]    Scheduling number:  [phone]    DANIEL Aaron, CNP

## 2025-04-01 ENCOUNTER — TELEPHONE (OUTPATIENT)
Dept: CARDIOLOGY | Facility: CLINIC | Age: 65
End: 2025-04-01
Payer: COMMERCIAL

## 2025-04-01 DIAGNOSIS — E78.2 MIXED HYPERLIPIDEMIA: ICD-10-CM

## 2025-04-01 RX ORDER — ROSUVASTATIN CALCIUM 10 MG/1
10 TABLET, COATED ORAL DAILY
Qty: 90 TABLET | Refills: 0 | Status: SHIPPED | OUTPATIENT
Start: 2025-04-01

## 2025-06-19 ENCOUNTER — TELEPHONE (OUTPATIENT)
Dept: CARDIOLOGY | Facility: CLINIC | Age: 65
End: 2025-06-19
Payer: COMMERCIAL

## 2025-08-18 ENCOUNTER — OFFICE VISIT (OUTPATIENT)
Dept: CARDIOLOGY | Facility: CLINIC | Age: 65
End: 2025-08-18
Payer: COMMERCIAL

## 2025-08-18 VITALS
DIASTOLIC BLOOD PRESSURE: 80 MMHG | HEART RATE: 78 BPM | WEIGHT: 186.1 LBS | SYSTOLIC BLOOD PRESSURE: 134 MMHG | OXYGEN SATURATION: 99 % | HEIGHT: 64 IN | BODY MASS INDEX: 31.77 KG/M2

## 2025-08-18 DIAGNOSIS — E78.2 MIXED HYPERLIPIDEMIA: ICD-10-CM

## 2025-08-18 DIAGNOSIS — I10 BENIGN ESSENTIAL HYPERTENSION: Primary | ICD-10-CM

## 2025-08-18 DIAGNOSIS — Z82.49 FAMILY HISTORY OF ISCHEMIC HEART DISEASE: ICD-10-CM

## 2025-08-18 RX ORDER — AMLODIPINE BESYLATE 5 MG/1
5 TABLET ORAL DAILY
Qty: 90 TABLET | Refills: 3 | Status: SHIPPED | OUTPATIENT
Start: 2025-08-18

## 2025-08-18 RX ORDER — ROSUVASTATIN CALCIUM 10 MG/1
10 TABLET, COATED ORAL DAILY
Qty: 90 TABLET | Refills: 3 | Status: SHIPPED | OUTPATIENT
Start: 2025-08-18